# Patient Record
Sex: MALE | Race: WHITE | NOT HISPANIC OR LATINO | Employment: FULL TIME | URBAN - METROPOLITAN AREA
[De-identification: names, ages, dates, MRNs, and addresses within clinical notes are randomized per-mention and may not be internally consistent; named-entity substitution may affect disease eponyms.]

---

## 2018-06-04 ENCOUNTER — HOSPITAL ENCOUNTER (EMERGENCY)
Facility: HOSPITAL | Age: 33
Discharge: HOME | End: 2018-06-04
Attending: EMERGENCY MEDICINE | Admitting: EMERGENCY MEDICINE

## 2018-06-04 VITALS
OXYGEN SATURATION: 93 % | BODY MASS INDEX: 24.5 KG/M2 | RESPIRATION RATE: 22 BRPM | SYSTOLIC BLOOD PRESSURE: 112 MMHG | HEART RATE: 87 BPM | WEIGHT: 175 LBS | DIASTOLIC BLOOD PRESSURE: 76 MMHG | TEMPERATURE: 98.5 F | HEIGHT: 71 IN

## 2018-06-04 DIAGNOSIS — S51.812A LACERATION OF LEFT FOREARM, INITIAL ENCOUNTER: Primary | ICD-10-CM

## 2018-06-04 DIAGNOSIS — S56.522A LACERATION OF EXTENSOR TENDON OF LEFT FOREARM, INITIAL ENCOUNTER: ICD-10-CM

## 2018-06-04 PROCEDURE — 99283 EMERGENCY DEPT VISIT LOW MDM: CPT

## 2018-06-04 RX ORDER — HYDROCODONE BITARTRATE AND ACETAMINOPHEN 5; 325 MG/1; MG/1
1-2 TABLET ORAL EVERY 4 HOURS PRN
Qty: 12 TABLET | Refills: 0 | Status: SHIPPED | OUTPATIENT
Start: 2018-06-04

## 2018-06-04 RX ORDER — HYDROCODONE BITARTRATE AND ACETAMINOPHEN 10; 325 MG/1; MG/1
1 TABLET ORAL ONCE
Status: COMPLETED | OUTPATIENT
Start: 2018-06-04 | End: 2018-06-04

## 2018-06-04 RX ADMIN — Medication 3 ML: at 16:40

## 2018-06-04 RX ADMIN — HYDROCODONE BITARTRATE AND ACETAMINOPHEN 1 TABLET: 10; 325 TABLET ORAL at 17:33

## 2018-06-04 NOTE — DISCHARGE INSTRUCTIONS
Dr. Gottlieb will operate on you tomorrow.  Arrive at the [facility] at [address] at 8:00 AM tomorrow.     Do not eat after midnight tonight.    Keep wound dressed until follow up.     Elevate laceration site to the level of the heart.    Do not work for 3 days.

## 2018-06-04 NOTE — ED PROVIDER NOTES
"Subjective   Mr. Bimal Pittman is a 32 year old male who presents to the ED with c/o laceration. He reports he was cutting some trees and brush with a machete when he accidentally cut himself on the dorsal aspect of the left forearm. The pain is 5/10 in severity. He denies any numbness in his left hand. He is up to date on his tetanus shot. There are no other acute symptoms at this time.        History provided by:  Patient  Laceration   Location:  Shoulder/arm  Shoulder/arm laceration location:  L forearm  Length:  3.5  Laceration mechanism:  Knife  Pain details:     Severity:  Moderate    Timing:  Constant    Progression:  Unchanged  Relieved by:  None tried  Worsened by:  Nothing  Ineffective treatments:  None tried  Tetanus status:  Up to date      Review of Systems   Neurological: Negative for numbness.   All other systems reviewed and are negative.      Past Medical History:   Diagnosis Date   • MRSA (methicillin resistant staph aureus) culture positive        Allergies   Allergen Reactions   • Other      abx given for previous mrsa infection. Pt reaction was feeling \"hot/burning\" inside       Past Surgical History:   Procedure Laterality Date   • INCISION AND DRAINAGE ABSCESS         History reviewed. No pertinent family history.    Social History     Social History   • Marital status: Single     Social History Main Topics   • Smoking status: Former Smoker   • Alcohol use 25.2 oz/week     42 Cans of beer per week      Comment: 6 pk beer per day   • Drug use: No     Other Topics Concern   • Not on file         Objective   Physical Exam   Constitutional: He is oriented to person, place, and time. He appears well-developed and well-nourished. No distress.   HENT:   Head: Normocephalic and atraumatic.   Nose: Nose normal.   Eyes: Conjunctivae are normal. No scleral icterus.   Neck: Normal range of motion.   Cardiovascular:   Pulses:       Radial pulses are 2+ on the right side, and 2+ on the left side. " "  Pulmonary/Chest: Effort normal. No respiratory distress.   Musculoskeletal:        Right shoulder: He exhibits laceration (left dorsal forearm).   There is a 3.5 cm obliquely oriented laceration on the dorsal aspect of the left forearm. Ability to extend all fingers of the left hand is lost. Patient is able to flex and extend left wrist. Laceration shows the tendon fully lacerated and lacerated musculature are noted. Wound is gaping wide, no foreign bodies noted. Mild venous ooze, no arterial bleeding. Hand  is normal.   Neurological: He is alert and oriented to person, place, and time. No sensory deficit.   Sensation is normal through left hand and wrist.   Skin: Skin is warm and dry.   Psychiatric: He has a normal mood and affect. His behavior is normal.   Nursing note and vitals reviewed.      Procedures         ED Course  ED Course as of Jun 05 0252 Mon Jun 04, 2018   1746 NELLIE query unsuccessful secondary to prolonged retrieval time/inoperable [employer] system.     [MS]      ED Course User Index  [MS] Gabino Duncan MD     No results found for this or any previous visit (from the past 24 hour(s)).  Note: In addition to lab results from this visit, the labs listed above may include labs taken at another facility or during a different encounter within the last 24 hours. Please correlate lab times with ED admission and discharge times for further clarification of the services performed during this visit.    No orders to display     Vitals:    06/04/18 1626   BP: 112/76   BP Location: Right arm   Patient Position: Sitting   Pulse: 87   Resp: 22   Temp: 98.5 °F (36.9 °C)   TempSrc: Oral   SpO2: 93%   Weight: 79.4 kg (175 lb)   Height: 180.3 cm (71\")     Medications   lidocaine-epinephrine-tetracaine (LET) topical gel 3 mL (3 mL Topical Given 6/4/18 1640)   HYDROcodone-acetaminophen (NORCO)  MG per tablet 1 tablet (1 tablet Oral Given 6/4/18 1733)     ECG/EMG Results (last 24 hours)     ** No results " found for the last 24 hours. **                        [facility]    Final diagnoses:   Laceration of left forearm, initial encounter   Laceration of extensor tendon of left forearm, initial encounter       Documentation assistance provided by kylie Gonzalez.  Information recorded by the scribe was done at my direction and has been verified and validated by me.     Amadou Gonzalez  06/04/18 1722       Amadou Gonzalez  06/04/18 1740       Amadou Gonzalez  06/04/18 1741       Gabino Duncan MD  06/05/18 0250